# Patient Record
Sex: FEMALE | Race: OTHER | NOT HISPANIC OR LATINO
[De-identification: names, ages, dates, MRNs, and addresses within clinical notes are randomized per-mention and may not be internally consistent; named-entity substitution may affect disease eponyms.]

---

## 2017-12-27 ENCOUNTER — RX RENEWAL (OUTPATIENT)
Age: 72
End: 2017-12-27

## 2017-12-29 ENCOUNTER — RX RENEWAL (OUTPATIENT)
Age: 72
End: 2017-12-29

## 2018-02-16 ENCOUNTER — APPOINTMENT (OUTPATIENT)
Dept: NEPHROLOGY | Facility: CLINIC | Age: 73
End: 2018-02-16
Payer: MEDICARE

## 2018-02-16 VITALS — SYSTOLIC BLOOD PRESSURE: 140 MMHG | DIASTOLIC BLOOD PRESSURE: 80 MMHG

## 2018-02-16 VITALS — DIASTOLIC BLOOD PRESSURE: 84 MMHG | HEART RATE: 64 BPM | SYSTOLIC BLOOD PRESSURE: 148 MMHG

## 2018-02-16 DIAGNOSIS — R73.03 PREDIABETES.: ICD-10-CM

## 2018-02-16 DIAGNOSIS — Z72.0 TOBACCO USE: ICD-10-CM

## 2018-02-16 PROCEDURE — 99214 OFFICE O/P EST MOD 30 MIN: CPT

## 2018-02-16 RX ORDER — BIMATOPROST 0.1 MG/ML
0.01 SOLUTION/ DROPS OPHTHALMIC
Qty: 7 | Refills: 0 | Status: ACTIVE | COMMUNITY
Start: 2017-08-22

## 2018-02-16 RX ORDER — BLOOD SUGAR DIAGNOSTIC
STRIP MISCELLANEOUS
Qty: 100 | Refills: 0 | Status: ACTIVE | COMMUNITY
Start: 2017-09-06

## 2018-02-16 RX ORDER — APIXABAN 5 MG/1
5 TABLET, FILM COATED ORAL TWICE DAILY
Qty: 180 | Refills: 0 | Status: ACTIVE | COMMUNITY
Start: 2017-04-14

## 2018-02-16 RX ORDER — LANCETS 33 GAUGE
EACH MISCELLANEOUS
Qty: 100 | Refills: 0 | Status: ACTIVE | COMMUNITY
Start: 2017-09-06

## 2018-03-21 ENCOUNTER — RX RENEWAL (OUTPATIENT)
Age: 73
End: 2018-03-21

## 2018-04-20 ENCOUNTER — APPOINTMENT (OUTPATIENT)
Dept: NEPHROLOGY | Facility: CLINIC | Age: 73
End: 2018-04-20
Payer: MEDICARE

## 2018-04-20 VITALS
BODY MASS INDEX: 29.09 KG/M2 | DIASTOLIC BLOOD PRESSURE: 74 MMHG | HEART RATE: 64 BPM | HEIGHT: 66 IN | WEIGHT: 181 LBS | SYSTOLIC BLOOD PRESSURE: 132 MMHG

## 2018-04-20 PROCEDURE — 99213 OFFICE O/P EST LOW 20 MIN: CPT

## 2018-10-19 ENCOUNTER — APPOINTMENT (OUTPATIENT)
Dept: NEPHROLOGY | Facility: CLINIC | Age: 73
End: 2018-10-19
Payer: MEDICARE

## 2018-10-19 VITALS — DIASTOLIC BLOOD PRESSURE: 84 MMHG | SYSTOLIC BLOOD PRESSURE: 130 MMHG | HEART RATE: 64 BPM

## 2018-10-19 PROCEDURE — 99213 OFFICE O/P EST LOW 20 MIN: CPT

## 2018-10-19 RX ORDER — POTASSIUM CHLORIDE 750 MG/1
10 TABLET, EXTENDED RELEASE ORAL DAILY
Refills: 0 | Status: DISCONTINUED | COMMUNITY
Start: 2017-09-27 | End: 2018-10-19

## 2019-01-10 ENCOUNTER — RX RENEWAL (OUTPATIENT)
Age: 74
End: 2019-01-10

## 2019-05-03 ENCOUNTER — APPOINTMENT (OUTPATIENT)
Dept: NEPHROLOGY | Facility: CLINIC | Age: 74
End: 2019-05-03
Payer: MEDICARE

## 2019-05-03 VITALS
DIASTOLIC BLOOD PRESSURE: 78 MMHG | HEART RATE: 56 BPM | SYSTOLIC BLOOD PRESSURE: 124 MMHG | WEIGHT: 192 LBS | BODY MASS INDEX: 30.99 KG/M2

## 2019-05-03 VITALS — DIASTOLIC BLOOD PRESSURE: 76 MMHG | SYSTOLIC BLOOD PRESSURE: 110 MMHG

## 2019-05-03 PROCEDURE — 99213 OFFICE O/P EST LOW 20 MIN: CPT

## 2019-05-03 RX ORDER — MULTIVIT-MIN/FOLIC/VIT K/LYCOP 400-300MCG
50 MCG TABLET ORAL
Qty: 30 | Refills: 5 | Status: ACTIVE | COMMUNITY
Start: 2019-05-03

## 2019-05-03 NOTE — PHYSICAL EXAM
[General Appearance - Alert] : alert [General Appearance - In No Acute Distress] : in no acute distress [Sclera] : the sclera and conjunctiva were normal [Jugular Venous Distention Increased] : there was no jugular-venous distention [Auscultation Breath Sounds / Voice Sounds] : lungs were clear to auscultation bilaterally [Heart Sounds Gallop] : no gallops [Murmurs] : no murmurs [Heart Sounds Pericardial Friction Rub] : no pericardial rub [Edema] : there was no peripheral edema [Abdomen Soft] : soft [Abdomen Tenderness] : non-tender [No CVA Tenderness] : no ~M costovertebral angle tenderness [Involuntary Movements] : no involuntary movements were seen [] : no rash [No Focal Deficits] : no focal deficits [Oriented To Time, Place, And Person] : oriented to person, place, and time [Affect] : the affect was normal [Bradycardic ___] : the heart rate was bradycardic at [unfilled] bpm [Regular] : the rhythm was regular

## 2019-05-03 NOTE — ASSESSMENT
[FreeTextEntry1] : CKD 3 stable \par BP  controlled - seems lower on left arm but good on right -(asymmetry may suggest CV disease?)\par with low HR wonder if can lower atenolol -- to d/w cardiologist  (Dr Roberts) \par no proteinuria \par on supplementation for low vit D - with slt high PTH --follow \par cont regimen \par discussed smoking cessation - has cut down \par f/u 6 mos \par

## 2019-05-03 NOTE — HISTORY OF PRESENT ILLNESS
[FreeTextEntry1] : f/u CKD\par no complaints \par no med changes \par remains off KCL - vit D added for low level \par PCP Dr Oconnor

## 2019-11-19 ENCOUNTER — APPOINTMENT (OUTPATIENT)
Dept: NEPHROLOGY | Facility: CLINIC | Age: 74
End: 2019-11-19
Payer: MEDICARE

## 2019-11-19 VITALS — SYSTOLIC BLOOD PRESSURE: 112 MMHG | DIASTOLIC BLOOD PRESSURE: 64 MMHG | HEART RATE: 64 BPM

## 2019-11-19 PROCEDURE — 99213 OFFICE O/P EST LOW 20 MIN: CPT

## 2019-11-19 NOTE — PHYSICAL EXAM
[General Appearance - Alert] : alert [General Appearance - In No Acute Distress] : in no acute distress [Sclera] : the sclera and conjunctiva were normal [Auscultation Breath Sounds / Voice Sounds] : lungs were clear to auscultation bilaterally [Jugular Venous Distention Increased] : there was no jugular-venous distention [Heart Rate And Rhythm] : heart rate was normal and rhythm regular [Heart Sounds Gallop] : no gallops [Heart Sounds Pericardial Friction Rub] : no pericardial rub [Murmurs] : no murmurs [Edema] : there was no peripheral edema [Abdomen Soft] : soft [No CVA Tenderness] : no ~M costovertebral angle tenderness [Abdomen Tenderness] : non-tender [Involuntary Movements] : no involuntary movements were seen [] : no rash [No Focal Deficits] : no focal deficits [Oriented To Time, Place, And Person] : oriented to person, place, and time [Affect] : the affect was normal

## 2019-11-19 NOTE — HISTORY OF PRESENT ILLNESS
[FreeTextEntry1] : f/u CKD \par no complaints \par reports on new med for 8 mos - ?spironolactone she thinks? -- put on by Dr Roberts for HTN she believes \par BP has been good at doctors office usually \par stopped smoking!\par PCP Dr Oconnor

## 2019-11-19 NOTE — ASSESSMENT
[FreeTextEntry1] : CKD 3 relatively  stable \par BP well controlled \par lytes good\par no albuminuria \par cont regimen -- clarify med list \par congratulated on smoking cessation\par f/u 6- 8 mos

## 2020-01-21 ENCOUNTER — RX RENEWAL (OUTPATIENT)
Age: 75
End: 2020-01-21

## 2020-06-15 ENCOUNTER — APPOINTMENT (OUTPATIENT)
Dept: NEPHROLOGY | Facility: CLINIC | Age: 75
End: 2020-06-15
Payer: MEDICARE

## 2020-06-15 VITALS
DIASTOLIC BLOOD PRESSURE: 74 MMHG | BODY MASS INDEX: 32.44 KG/M2 | SYSTOLIC BLOOD PRESSURE: 112 MMHG | HEART RATE: 60 BPM | WEIGHT: 201 LBS

## 2020-06-15 DIAGNOSIS — N17.9 ACUTE KIDNEY FAILURE, UNSPECIFIED: ICD-10-CM

## 2020-06-15 PROCEDURE — 99214 OFFICE O/P EST MOD 30 MIN: CPT

## 2020-06-15 NOTE — HISTORY OF PRESENT ILLNESS
[FreeTextEntry1] : f/u CKD\par no complaints \par meds reviewed -- no changes except only on two atenolol  a day now --thinks also on spironolactone (or sounds like it) still but not sure (replaced some other pill by cardiology) \par hasn’t checked BP at home- good last visit with PCP\par gaining weight with sedentary lifestyle during COVID\par PCP Dr Oconnor\par

## 2020-06-15 NOTE — PHYSICAL EXAM
[General Appearance - Alert] : alert [General Appearance - In No Acute Distress] : in no acute distress [Sclera] : the sclera and conjunctiva were normal [Jugular Venous Distention Increased] : there was no jugular-venous distention [Auscultation Breath Sounds / Voice Sounds] : lungs were clear to auscultation bilaterally [Heart Rate And Rhythm] : heart rate was normal and rhythm regular [Heart Sounds Gallop] : no gallops [Murmurs] : no murmurs [Heart Sounds Pericardial Friction Rub] : no pericardial rub [Edema] : there was no peripheral edema [Abdomen Soft] : soft [Abdomen Tenderness] : non-tender [No CVA Tenderness] : no ~M costovertebral angle tenderness [Involuntary Movements] : no involuntary movements were seen [] : no rash [Oriented To Time, Place, And Person] : oriented to person, place, and time [Affect] : the affect was normal

## 2020-06-15 NOTE — ASSESSMENT
[FreeTextEntry1] : CKD 3 -- creat has trended up from baseline of about 1.2-1.4\par BP very well controlled with no albuminuria\par no sign CHF \par may consider lower med-- eg ARB (or spironolactone if confirmed that on that ) -- will recheck chem now to see if creat still up\par pt to confirm additional med she is on -- plan d/w cardiology if indeed spironolactone (if needs it  than will likely lower ARB) \par f/u 3-4 mos likely \par

## 2021-01-08 ENCOUNTER — RX RENEWAL (OUTPATIENT)
Age: 76
End: 2021-01-08

## 2021-01-22 ENCOUNTER — APPOINTMENT (OUTPATIENT)
Dept: NEPHROLOGY | Facility: CLINIC | Age: 76
End: 2021-01-22
Payer: MEDICARE

## 2021-01-22 VITALS
BODY MASS INDEX: 32.28 KG/M2 | HEART RATE: 73 BPM | DIASTOLIC BLOOD PRESSURE: 76 MMHG | SYSTOLIC BLOOD PRESSURE: 122 MMHG | WEIGHT: 200 LBS

## 2021-01-22 PROCEDURE — 99072 ADDL SUPL MATRL&STAF TM PHE: CPT

## 2021-01-22 PROCEDURE — 99213 OFFICE O/P EST LOW 20 MIN: CPT

## 2021-01-22 RX ORDER — ATENOLOL 25 MG/1
25 TABLET ORAL DAILY
Qty: 180 | Refills: 3 | Status: DISCONTINUED | COMMUNITY
Start: 2016-11-30 | End: 2021-01-22

## 2021-01-25 NOTE — ASSESSMENT
[FreeTextEntry1] : CKD 3 stable \par HTN controlled and lytes good with addition of low dose aldactone-no hyperkalemia despite 2 RAAS agents\par foot pain - not obviously inflamed to suggest gout - doen have decreased pulses b/l though also not grossly ischemic\par advised d/w PCP if persists and f/u podiatry +/- vascular \par cont BP regimen\par f/u 4 mos\par \par \par \par

## 2021-01-25 NOTE — PHYSICAL EXAM
[General Appearance - Alert] : alert [General Appearance - In No Acute Distress] : in no acute distress [Sclera] : the sclera and conjunctiva were normal [Jugular Venous Distention Increased] : there was no jugular-venous distention [Auscultation Breath Sounds / Voice Sounds] : lungs were clear to auscultation bilaterally [Heart Rate And Rhythm] : heart rate was normal and rhythm regular [Heart Sounds Gallop] : no gallops [Murmurs] : no murmurs [Heart Sounds Pericardial Friction Rub] : no pericardial rub [Edema] : there was no peripheral edema [Abdomen Tenderness] : non-tender [Abdomen Soft] : soft [No CVA Tenderness] : no ~M costovertebral angle tenderness [Involuntary Movements] : no involuntary movements were seen [] : no rash [No Focal Deficits] : no focal deficits [Oriented To Time, Place, And Person] : oriented to person, place, and time [Affect] : the affect was normal [FreeTextEntry1] : rt foor not grossly inflames (or ischemic)

## 2021-05-25 ENCOUNTER — APPOINTMENT (OUTPATIENT)
Dept: VASCULAR SURGERY | Facility: CLINIC | Age: 76
End: 2021-05-25
Payer: MEDICARE

## 2021-05-25 ENCOUNTER — APPOINTMENT (OUTPATIENT)
Dept: NEPHROLOGY | Facility: CLINIC | Age: 76
End: 2021-05-25
Payer: MEDICARE

## 2021-05-25 VITALS
SYSTOLIC BLOOD PRESSURE: 132 MMHG | WEIGHT: 200 LBS | BODY MASS INDEX: 32.28 KG/M2 | HEART RATE: 82 BPM | DIASTOLIC BLOOD PRESSURE: 77 MMHG

## 2021-05-25 DIAGNOSIS — R09.89 OTHER SPECIFIED SYMPTOMS AND SIGNS INVOLVING THE CIRCULATORY AND RESPIRATORY SYSTEMS: ICD-10-CM

## 2021-05-25 PROCEDURE — 99213 OFFICE O/P EST LOW 20 MIN: CPT

## 2021-05-25 PROCEDURE — 99204 OFFICE O/P NEW MOD 45 MIN: CPT

## 2021-05-25 PROCEDURE — 93923 UPR/LXTR ART STDY 3+ LVLS: CPT

## 2021-05-25 NOTE — PHYSICAL EXAM
[General Appearance - Alert] : alert [Sclera] : the sclera and conjunctiva were normal [General Appearance - In No Acute Distress] : in no acute distress [Jugular Venous Distention Increased] : there was no jugular-venous distention [Auscultation Breath Sounds / Voice Sounds] : lungs were clear to auscultation bilaterally [Heart Rate And Rhythm] : heart rate was normal and rhythm regular [Heart Sounds Gallop] : no gallops [Murmurs] : no murmurs [Heart Sounds Pericardial Friction Rub] : no pericardial rub [Edema] : there was no peripheral edema [Abdomen Soft] : soft [Abdomen Tenderness] : non-tender [No CVA Tenderness] : no ~M costovertebral angle tenderness [Involuntary Movements] : no involuntary movements were seen [] : no rash [No Focal Deficits] : no focal deficits [Oriented To Time, Place, And Person] : oriented to person, place, and time [Affect] : the affect was normal

## 2021-05-25 NOTE — HISTORY OF PRESENT ILLNESS
[FreeTextEntry1] : f/u CKD\par no complaints \par BP has been good at doctor visits\par still  rt toe pain (no redness or swelling)   -- saw vascular and said circulation good-- seeing podiatry \par labs done and reviewed - see below \par meds reviewed with pt and list updated\par PCP Dr Oconnor \par

## 2021-05-25 NOTE — REVIEW OF SYSTEMS
[Limb Pain] : limb pain [As Noted in HPI] : as noted in HPI [Difficulty Walking] : difficulty walking [Negative] : Heme/Lymph

## 2021-05-25 NOTE — ASSESSMENT
[FreeTextEntry1] : CKD 3 stable \par BP acceptable\par no albuminuria\par lytes ok \par cont regimen \par f/u 6 mos

## 2021-05-28 NOTE — ASSESSMENT
[FreeTextEntry1] : 75yoF w/chronic h/o HTN/prediabetes, recently diagnosed CRI, referred by Dr. Bettie Broussard for non-palpable b/l LE pedal pulses in the setting of radiating R lateral thigh pain at random times, and numbness of the toes and intermittent R great toe pain, especially at night.  Pt states her ambulation is not limited by muscle cramping or leg pain, and she denies rest pain in the feet/toes, or ulcers/wounds of the LEs.\par \par LEs appear well-perfused on exam, and pedal pulses faint but appreciated on exam.  LEYLA/PVRs reveal biphasic waveforms throughout LEs b/l, LEYLA 1.1/0.94 in L/R, respectively.  Recommend pt continue tight glycemic control and exercise regularly.  Explained that evaluation for peripheral neuropathy may be indicated to further evaluate her radiating pain and pedal numbness.\par \par I, Dr. José Manuel Melgar, personally performed the evaluation and management (E/M) services for this new patient.  That E/M includes conducting the initial examination, assessing all conditions, and establishing the plan of care.  Today, ACP, Jose Bernstein, was here to observe my evaluation and management services for this patient to be followed going forward.

## 2021-05-28 NOTE — HISTORY OF PRESENT ILLNESS
[FreeTextEntry1] : 75yoF w/chronic h/o HTN/prediabetes, Afib on eliquis, recently diagnosed CRI, referred by Dr. Bettie Broussard for non-palpable b/l LE pedal pulses in the setting of radiating R lateral thigh pain at random times, and numbness of the toes and intermittent R great toe pain, especially at night.  Pt states her ambulation is not limited by muscle cramping or leg pain, and she denies rest pain in the feet/toes, or ulcers/wounds of the LEs.

## 2021-05-28 NOTE — PHYSICAL EXAM
[Normal Thyroid] : the thyroid was normal [Normal Breath Sounds] : Normal breath sounds [Normal Heart Sounds] : normal heart sounds [2+] : left 2+ [1+] : left 1+ [Ankle Swelling (On Exam)] : present [Ankle Swelling Bilaterally] : bilaterally  [Ankle Swelling On The Right] : mild [No Rash or Lesion] : No rash or lesion [Alert] : alert [Oriented to Person] : oriented to person [Oriented to Place] : oriented to place [Oriented to Time] : oriented to time [Calm] : calm [JVD] : no jugular venous distention  [Varicose Veins Of Lower Extremities] : not present [] : not present [Abdomen Masses] : No abdominal masses [Abdomen Tenderness] : ~T ~M No abdominal tenderness [Purpura] : no purpura  [Petechiae] : no petechiae [Skin Ulcer] : no ulcer [Skin Induration] : no induration [de-identified] : NC/AT, anicteric [de-identified] : Well-nourished, NAD [de-identified] : FROM throughout, strength 5/5x4, no muscle atrophy appreciated [de-identified] : Proprioception grossly intact in LEs b/l

## 2021-05-28 NOTE — PROCEDURE
[FreeTextEntry1] : LEYLA/PVRs performed to assess for peripheral arterial perfusion reveal biphasic waveforms throughout LEs b/l, LEYLA 1.1/0.94 in L/R, respectively

## 2021-05-28 NOTE — REASON FOR VISIT
[Consultation] : a consultation visit [FreeTextEntry1] : Non-palpable pedal pulses as per Dr. Broussard; radiating R lateral thigh pain w/numbness of toes

## 2021-10-20 ENCOUNTER — RX RENEWAL (OUTPATIENT)
Age: 76
End: 2021-10-20

## 2021-10-20 RX ORDER — AMLODIPINE BESYLATE AND BENAZEPRIL HYDROCHLORIDE 10; 40 MG/1; MG/1
10-40 CAPSULE ORAL
Qty: 90 | Refills: 3 | Status: ACTIVE | COMMUNITY
Start: 2017-12-29 | End: 1900-01-01

## 2021-12-29 ENCOUNTER — APPOINTMENT (OUTPATIENT)
Dept: NEPHROLOGY | Facility: CLINIC | Age: 76
End: 2021-12-29
Payer: MEDICARE

## 2021-12-29 VITALS
WEIGHT: 203 LBS | BODY MASS INDEX: 32.77 KG/M2 | HEART RATE: 91 BPM | DIASTOLIC BLOOD PRESSURE: 81 MMHG | SYSTOLIC BLOOD PRESSURE: 128 MMHG

## 2021-12-29 DIAGNOSIS — E55.9 VITAMIN D DEFICIENCY, UNSPECIFIED: ICD-10-CM

## 2021-12-29 PROCEDURE — 99213 OFFICE O/P EST LOW 20 MIN: CPT

## 2021-12-29 NOTE — HISTORY OF PRESENT ILLNESS
[FreeTextEntry1] : f/u CKD \par no complaints\par Not checking BP at home-- was good with PCP \par smoking 1/2 PPD \par labs done and reviewed - see below\par meds reviewed with pt-no changes \par PCP Dr Oconnor

## 2021-12-29 NOTE — ASSESSMENT
[FreeTextEntry1] : CKD 3 stable function \par no albuminuria, BP controlled \par PTH ok for CKD -- vit D low side--advised take extra vit D qod for now \par f/u 6 mos \par \par

## 2022-06-21 ENCOUNTER — APPOINTMENT (OUTPATIENT)
Dept: NEPHROLOGY | Facility: CLINIC | Age: 77
End: 2022-06-21

## 2022-07-19 ENCOUNTER — APPOINTMENT (OUTPATIENT)
Dept: NEPHROLOGY | Facility: CLINIC | Age: 77
End: 2022-07-19

## 2022-07-19 VITALS
DIASTOLIC BLOOD PRESSURE: 81 MMHG | HEIGHT: 66 IN | OXYGEN SATURATION: 96 % | HEART RATE: 80 BPM | SYSTOLIC BLOOD PRESSURE: 134 MMHG

## 2022-07-19 VITALS — SYSTOLIC BLOOD PRESSURE: 131 MMHG | DIASTOLIC BLOOD PRESSURE: 79 MMHG

## 2022-07-19 PROCEDURE — 99214 OFFICE O/P EST MOD 30 MIN: CPT

## 2022-07-19 RX ORDER — LATANOPROST/PF 0.005 %
0.01 DROPS OPHTHALMIC (EYE)
Qty: 7 | Refills: 0 | Status: ACTIVE | COMMUNITY
Start: 2021-12-21

## 2022-07-19 NOTE — HISTORY OF PRESENT ILLNESS
[FreeTextEntry1] : f/u CKD\par no complaints \par meds reviewed-- no change\par labs done and reviewed - see below\par Not checking BP at home btu wasa good last visit with PCP \par PCP Dr Oconnor \par card Dr Roberts

## 2022-07-19 NOTE — ASSESSMENT
[FreeTextEntry1] : CKD 3 stable fxn \par no albuminuria, lytes good \par BP acceptable -- though discussed BP not optimal per SPRINT study \par discussed lifestyle mod , weight loss for obesity \par follow BP -- reluctant to increase aldactone with CKD and high nl K \par can consider low dose thiazide if BP suboptimal \par f/u 6 mos \par

## 2022-11-14 ENCOUNTER — APPOINTMENT (OUTPATIENT)
Dept: PULMONOLOGY | Facility: CLINIC | Age: 77
End: 2022-11-14

## 2022-11-14 ENCOUNTER — OUTPATIENT (OUTPATIENT)
Dept: OUTPATIENT SERVICES | Facility: HOSPITAL | Age: 77
LOS: 1 days | End: 2022-11-14
Payer: MEDICARE

## 2022-11-14 VITALS
DIASTOLIC BLOOD PRESSURE: 84 MMHG | TEMPERATURE: 97.3 F | WEIGHT: 193 LBS | HEART RATE: 93 BPM | SYSTOLIC BLOOD PRESSURE: 149 MMHG | HEIGHT: 66 IN | OXYGEN SATURATION: 97 % | BODY MASS INDEX: 31.02 KG/M2

## 2022-11-14 PROCEDURE — 94010 BREATHING CAPACITY TEST: CPT | Mod: 26

## 2022-11-14 PROCEDURE — 99204 OFFICE O/P NEW MOD 45 MIN: CPT | Mod: 25

## 2022-11-14 PROCEDURE — 94727 GAS DIL/WSHOT DETER LNG VOL: CPT | Mod: 26

## 2022-11-14 PROCEDURE — ZZZZZ: CPT

## 2022-11-14 PROCEDURE — 71046 X-RAY EXAM CHEST 2 VIEWS: CPT | Mod: 26

## 2022-11-14 PROCEDURE — 94729 DIFFUSING CAPACITY: CPT | Mod: 26

## 2022-11-14 PROCEDURE — 71046 X-RAY EXAM CHEST 2 VIEWS: CPT

## 2022-11-14 NOTE — ASSESSMENT
[FreeTextEntry1] : Pulmonary function testing done today shows FEV1 1.32 L, 57% predicted, lung volumes look restricted with TLC 62% predicted.  Diffusion capacity 56% predicted, normal when corrected for alveolar volume.  Is mostly a restrictive defect with normal FEV1 FVC ratio.\par \par CXR ordered for today- wet read no obvious inrfiltrate\par \par Apparently severe pneumonia discharge about 6 weeks ago, clinically improving.  I am not sure she needs any additional treatment at this time.  I will see her again in about 6 weeks.\par \par

## 2022-11-14 NOTE — HISTORY OF PRESENT ILLNESS
[TextBox_4] : 11/14/2022 :  SOILA ARIAS is a 77 year old forme smoker female (quit 8/2022, 1PPD x 60 years) with PMHx HTN, A.fib on eliquis,  DM, HLD who is here for c/o  PRUITT and cough. \par \par She was hospitalized from 9/23-10/3 for PNA at [facility].  She tells me she required a respirator, but does not describe being intubated.  She is dyspneic after her discharge. She also c/o cough which is non productive. She states also had test positive for covid last month without hospitalization. She is now here for further evaluation and  treatment \par \par She tells me she has had atrial fibrillation for more than 10 years, chose not to have cardioversion or ablation and has not felt troubled by this.  She is not aware of palpitations.\par \par \par \par

## 2022-11-14 NOTE — PHYSICAL EXAM
[No Acute Distress] : no acute distress [Normal Oropharynx] : normal oropharynx [Normal Appearance] : normal appearance [No Neck Mass] : no neck mass [Normal Rate/Rhythm] : normal rate/rhythm [Normal S1, S2] : normal s1, s2 [No Resp Distress] : no resp distress [Clear to Auscultation Bilaterally] : clear to auscultation bilaterally [No Abnormalities] : no abnormalities [No Focal Deficits] : no focal deficits [Oriented x3] : oriented x3 [Normal Affect] : normal affect

## 2022-11-30 ENCOUNTER — APPOINTMENT (OUTPATIENT)
Dept: NEPHROLOGY | Facility: CLINIC | Age: 77
End: 2022-11-30

## 2022-11-30 VITALS
SYSTOLIC BLOOD PRESSURE: 120 MMHG | DIASTOLIC BLOOD PRESSURE: 68 MMHG | WEIGHT: 190 LBS | HEART RATE: 94 BPM | BODY MASS INDEX: 30.67 KG/M2

## 2022-11-30 PROCEDURE — 99214 OFFICE O/P EST MOD 30 MIN: CPT

## 2022-11-30 RX ORDER — SPIRONOLACTONE 25 MG/1
25 TABLET ORAL DAILY
Qty: 45 | Refills: 3 | Status: DISCONTINUED | COMMUNITY
Start: 2021-01-11 | End: 2022-11-30

## 2022-11-30 RX ORDER — METOPROLOL SUCCINATE 25 MG/1
25 TABLET, EXTENDED RELEASE ORAL DAILY
Qty: 10 | Refills: 0 | Status: ACTIVE | COMMUNITY
Start: 2022-10-04 | End: 1900-01-01

## 2022-11-30 NOTE — HISTORY OF PRESENT ILLNESS
[FreeTextEntry1] : f/u CKD \par has been having chronic PRUITT since admission for pneumonia at [facility]-- 9/23- 10/3\par taken off aldactone and metoprolol added for irregular HR  (though has  ran out of it but restarting per cardiology) \par meds reviewed with pt \par BP has been good at doctor visits 120s systolic\par has chem available- no other labs\par PCP Dr Oconnor \par card -  Dr Melanie Roberts \par \par \par

## 2022-11-30 NOTE — ASSESSMENT
[FreeTextEntry1] : CKD 3 appears stable; post admission \par volume seem good\par BP controlled off aldactone -- and to restart metoprolol\par cont regimen incl ACEI \par f/u 3-4 mos with labs incl urine sample --labs ordered

## 2023-01-17 ENCOUNTER — APPOINTMENT (OUTPATIENT)
Dept: NEPHROLOGY | Facility: CLINIC | Age: 78
End: 2023-01-17

## 2023-01-18 ENCOUNTER — APPOINTMENT (OUTPATIENT)
Dept: PULMONOLOGY | Facility: CLINIC | Age: 78
End: 2023-01-18
Payer: MEDICARE

## 2023-01-18 VITALS
BODY MASS INDEX: 32.3 KG/M2 | TEMPERATURE: 97.7 F | HEIGHT: 66 IN | HEART RATE: 80 BPM | OXYGEN SATURATION: 96 % | DIASTOLIC BLOOD PRESSURE: 85 MMHG | WEIGHT: 201 LBS | SYSTOLIC BLOOD PRESSURE: 149 MMHG

## 2023-01-18 DIAGNOSIS — R05.9 COUGH, UNSPECIFIED: ICD-10-CM

## 2023-01-18 PROCEDURE — 99214 OFFICE O/P EST MOD 30 MIN: CPT

## 2023-01-19 PROBLEM — R05.9 COUGH: Status: ACTIVE | Noted: 2022-11-14

## 2023-01-19 NOTE — HISTORY OF PRESENT ILLNESS
[FreeTextEntry1] : 01/18/2023 :  SOILA ARIAS is a 77 year former smoker   (quit 8/2022, 1PPD x 60 years) with PMHx HTN, atrial fibrillation  on eliquis, DM, HLD who is here for follow up on and off SOB.  \par \par She is still c/o intermittent SOB even at rest but denies cough and denies using any inhaler. \par \par Her last chest xray was from Nov 2022 which showed: Unremarkable cardiomediastinal silhouette. Vascular calcification thoracic aorta.The lungs are clear. No pleural effusions. No pneumothorax. No acute abnormalities of the soft tissues and osseous structures. Her PFT from Nov showed moderate decrease in diffuse capacity. \par \par She gained about 10lb since the last visit and denies any new complains or diagnosis since the last visit\par

## 2023-01-19 NOTE — PHYSICAL EXAM
[General Appearance - In No Acute Distress] : no acute distress [Normal Oropharynx] : normal oropharynx [Apical Impulse] : the apical impulse was normal [Heart Sounds] : normal S1 and S2 [Exaggerated Use Of Accessory Muscles For Inspiration] : no accessory muscle use [Abnormal Walk] : normal gait [Involuntary Movements] : no involuntary movements were seen [No Focal Deficits] : no focal deficits [Oriented To Time, Place, And Person] : oriented to person, place, and time [Affect] : the affect was normal [Nail Clubbing] : no clubbing of the fingernails [Cyanosis, Localized] : no localized cyanosis [Petechial Hemorrhages (___cm)] : no petechial hemorrhages [] : no ischemic changes

## 2023-01-19 NOTE — ASSESSMENT
[FreeTextEntry1] : 76 y/o obese F with c/o SOB and clear chest xray who is here for follow up after still c/o SOB.   Will get CT chest to r/o interstitial lung disease, echocardiogram to r/o cardiac etiology or pulmonary hypertension

## 2023-02-16 ENCOUNTER — APPOINTMENT (OUTPATIENT)
Dept: PULMONOLOGY | Facility: CLINIC | Age: 78
End: 2023-02-16
Payer: MEDICARE

## 2023-02-16 VITALS
DIASTOLIC BLOOD PRESSURE: 80 MMHG | TEMPERATURE: 97.6 F | HEIGHT: 66 IN | SYSTOLIC BLOOD PRESSURE: 149 MMHG | BODY MASS INDEX: 32.3 KG/M2 | WEIGHT: 201 LBS | HEART RATE: 79 BPM | OXYGEN SATURATION: 97 %

## 2023-02-16 DIAGNOSIS — R06.02 SHORTNESS OF BREATH: ICD-10-CM

## 2023-02-16 DIAGNOSIS — R91.8 OTHER NONSPECIFIC ABNORMAL FINDING OF LUNG FIELD: ICD-10-CM

## 2023-02-16 PROCEDURE — 94060 EVALUATION OF WHEEZING: CPT | Mod: 26

## 2023-02-16 PROCEDURE — 99214 OFFICE O/P EST MOD 30 MIN: CPT | Mod: 25

## 2023-02-16 PROCEDURE — 94727 GAS DIL/WSHOT DETER LNG VOL: CPT | Mod: 26

## 2023-02-16 PROCEDURE — ZZZZZ: CPT

## 2023-02-16 PROCEDURE — 94729 DIFFUSING CAPACITY: CPT | Mod: 26

## 2023-02-17 NOTE — PHYSICAL EXAM
[General Appearance - Well Developed] : well developed [Normal Appearance] : normal appearance [Well Groomed] : well groomed [General Appearance - Well Nourished] : well nourished [No Deformities] : no deformities [General Appearance - In No Acute Distress] : no acute distress [Heart Rate And Rhythm] : heart rate was normal and rhythm regular [Heart Sounds] : normal S1 and S2 [Heart Sounds Gallop] : no gallops [Murmurs] : no murmurs [Heart Sounds Pericardial Friction Rub] : no pericardial rub [] : no respiratory distress [Exaggerated Use Of Accessory Muscles For Inspiration] : no accessory muscle use [Auscultation Breath Sounds / Voice Sounds] : lungs were clear to auscultation bilaterally [FreeTextEntry1] : Decreased breath sounds bilaterally

## 2023-02-17 NOTE — ASSESSMENT
[FreeTextEntry1] : COPD, moderate\par \par Chest x-ray in November normal.  CT chest January 30, 2023 shows hyperinflation without obvious emphysema, several small groundglass opacities, largest 7 mm right upper lobe.  Cardiomegaly and coronary artery calcifications.\par \par Start Anoro Ellipta, demonstrated in office (LABA/LAMA).  Return for evaluation in about a month to see what subjective improvement she has with this.  She will need her chest CAT scan repeated in about 6 months.

## 2023-02-17 NOTE — HISTORY OF PRESENT ILLNESS
[FreeTextEntry1] : 01/18/2023 :  SOILA ARIAS is a 77 year former smoker   (quit 8/2022, 1PPD x 60 years) with PMHx HTN, atrial fibrillation  on eliquis, DM, HLD who is here for follow up on and off SOB.  \par \par She is still c/o intermittent SOB even at rest but denies cough and denies using any inhaler. \par \par Her last chest xray was from Nov 2022 which showed: Unremarkable cardiomediastinal silhouette. Vascular calcification thoracic aorta.The lungs are clear. No pleural effusions. No pneumothorax. No acute abnormalities of the soft tissues and osseous structures. Her PFT from Nov showed moderate decrease in diffuse capacity. \par \par She gained about 10lb since the last visit and denies any new complains or diagnosis since the last visit\par \par 2/17/23:  No significant medical history or changes in medication since last visit.  Here today for pulmonary function testing: moderate obstruction, FEV1 69% predicted, small improvement with bronchodilators.  Fusion capacity 66% predicted\par

## 2023-03-08 ENCOUNTER — APPOINTMENT (OUTPATIENT)
Dept: PULMONOLOGY | Facility: CLINIC | Age: 78
End: 2023-03-08

## 2023-03-16 ENCOUNTER — APPOINTMENT (OUTPATIENT)
Dept: PULMONOLOGY | Facility: CLINIC | Age: 78
End: 2023-03-16
Payer: MEDICARE

## 2023-03-16 VITALS
HEIGHT: 66 IN | DIASTOLIC BLOOD PRESSURE: 89 MMHG | BODY MASS INDEX: 32.47 KG/M2 | SYSTOLIC BLOOD PRESSURE: 159 MMHG | TEMPERATURE: 98.3 F | WEIGHT: 202 LBS | OXYGEN SATURATION: 96 % | HEART RATE: 78 BPM

## 2023-03-16 DIAGNOSIS — J44.9 CHRONIC OBSTRUCTIVE PULMONARY DISEASE, UNSPECIFIED: ICD-10-CM

## 2023-03-16 PROCEDURE — 99213 OFFICE O/P EST LOW 20 MIN: CPT

## 2023-03-17 PROBLEM — J44.9 COPD (CHRONIC OBSTRUCTIVE PULMONARY DISEASE): Status: ACTIVE | Noted: 2023-02-16

## 2023-03-17 NOTE — HISTORY OF PRESENT ILLNESS
[FreeTextEntry1] : 01/18/2023 :  SOILA ARIAS is a 77 year former smoker   (quit 8/2022, 1PPD x 60 years) with PMHx HTN, atrial fibrillation  on eliquis, DM, HLD who is here for follow up on and off SOB.  \par \par She is still c/o intermittent SOB even at rest but denies cough and denies using any inhaler. \par \par Her last chest xray was from Nov 2022 which showed: Unremarkable cardiomediastinal silhouette. Vascular calcification thoracic aorta.The lungs are clear. No pleural effusions. No pneumothorax. No acute abnormalities of the soft tissues and osseous structures. Her PFT from Nov showed moderate decrease in diffuse capacity. \par \par She gained about 10lb since the last visit and denies any new complains or diagnosis since the last visit\par \par 2/17/23:  No significant medical history or changes in medication since last visit.  Here today for pulmonary function testing: moderate obstruction, FEV1 69% predicted, small improvement with bronchodilators.  diffusion capacity 66% predicted\par \par 3/16/23:  On Anoro daily for one month, has noted good improvement in shortness of breath.  No significant exacerbations of her lung disease since last seen.  No significant medical history or changes in medication since last visit. \par

## 2023-03-17 NOTE — ASSESSMENT
[FreeTextEntry1] : COPD with subjective improvement using Anoro (LABA LAMA).  I reassured her that this is not a steroid and I would like her to use this long-term.  I would like to see her again in about 4 months.

## 2023-03-21 ENCOUNTER — APPOINTMENT (OUTPATIENT)
Dept: NEPHROLOGY | Facility: CLINIC | Age: 78
End: 2023-03-21
Payer: MEDICARE

## 2023-03-21 VITALS — SYSTOLIC BLOOD PRESSURE: 123 MMHG | HEART RATE: 66 BPM | DIASTOLIC BLOOD PRESSURE: 71 MMHG

## 2023-03-21 VITALS — SYSTOLIC BLOOD PRESSURE: 130 MMHG | HEART RATE: 75 BPM | DIASTOLIC BLOOD PRESSURE: 69 MMHG

## 2023-03-21 PROCEDURE — 99214 OFFICE O/P EST MOD 30 MIN: CPT

## 2023-03-22 NOTE — ASSESSMENT
[FreeTextEntry1] : CKD 3 appears stable\par volume and lytes stable\par BP well controlled off aldactone\par cont same regimen incl ACEI\par Jardiance 10mg started by PCP, labs requested iby PCP n 30 days to monitor Cr (though I am not sure necessary as slight decrease eGR is expected initially )  will order labs for 4/15 and then labs again prior to next visit \par f/u 5-6 mos with labs

## 2023-03-22 NOTE — HISTORY OF PRESENT ILLNESS
[FreeTextEntry1] : f/u CKD \par has been having chronic PRUITT since admission for pneumonia at [facility]-- 9/23-10/3, going to see a new pulmonologist\par Started on Jardiance 10mg by PCP on 3/15 for DM\par Does not check BP at home\par meds reviewed with pt \par Labs reviewed, see below\par PCP Dr Oconnor \par card - Dr Melanie Roberts

## 2023-03-22 NOTE — PHYSICAL EXAM
[General Appearance - Alert] : alert [General Appearance - In No Acute Distress] : in no acute distress [General Appearance - Well Nourished] : well nourished [Sclera] : the sclera and conjunctiva were normal [Hearing Threshold Finger Rub Not [place]] : hearing was normal [Neck Appearance] : the appearance of the neck was normal [Exaggerated Use Of Accessory Muscles For Inspiration] : no accessory muscle use [Heart Sounds] : normal S1 and S2 [Abdomen Soft] : soft [Abdomen Tenderness] : non-tender [No CVA Tenderness] : no ~M costovertebral angle tenderness [] : no rash [Sensation] : the sensory exam was normal to light touch and pinprick [Motor Exam] : the motor exam was normal [Oriented To Time, Place, And Person] : oriented to person, place, and time [FreeTextEntry1] : trace edema B/L LE

## 2023-09-19 ENCOUNTER — APPOINTMENT (OUTPATIENT)
Dept: NEPHROLOGY | Facility: CLINIC | Age: 78
End: 2023-09-19
Payer: MEDICARE

## 2023-09-19 VITALS — SYSTOLIC BLOOD PRESSURE: 151 MMHG | DIASTOLIC BLOOD PRESSURE: 82 MMHG

## 2023-09-19 VITALS
WEIGHT: 215 LBS | HEART RATE: 72 BPM | DIASTOLIC BLOOD PRESSURE: 85 MMHG | BODY MASS INDEX: 34.7 KG/M2 | SYSTOLIC BLOOD PRESSURE: 163 MMHG

## 2023-09-19 DIAGNOSIS — I10 ESSENTIAL (PRIMARY) HYPERTENSION: ICD-10-CM

## 2023-09-19 PROCEDURE — 99214 OFFICE O/P EST MOD 30 MIN: CPT

## 2023-09-19 RX ORDER — FLUTICASONE FUROATE, UMECLIDINIUM BROMIDE AND VILANTEROL TRIFENATATE 100; 62.5; 25 UG/1; UG/1; UG/1
100-62.5-25 POWDER RESPIRATORY (INHALATION)
Refills: 0 | Status: ACTIVE | COMMUNITY
Start: 2023-09-19

## 2023-09-19 RX ORDER — ATORVASTATIN CALCIUM 40 MG/1
40 TABLET, FILM COATED ORAL DAILY
Refills: 0 | Status: ACTIVE | COMMUNITY
Start: 2017-04-14

## 2023-09-19 RX ORDER — UMECLIDINIUM BROMIDE AND VILANTEROL TRIFENATATE 62.5; 25 UG/1; UG/1
62.5-25 POWDER RESPIRATORY (INHALATION)
Qty: 3 | Refills: 3 | Status: DISCONTINUED | COMMUNITY
Start: 2023-02-16 | End: 2023-09-19

## 2024-01-16 ENCOUNTER — APPOINTMENT (OUTPATIENT)
Dept: NEPHROLOGY | Facility: CLINIC | Age: 79
End: 2024-01-16
Payer: MEDICARE

## 2024-01-16 VITALS — SYSTOLIC BLOOD PRESSURE: 122 MMHG | HEART RATE: 78 BPM | DIASTOLIC BLOOD PRESSURE: 80 MMHG

## 2024-01-16 DIAGNOSIS — N18.30 CHRONIC KIDNEY DISEASE, STAGE 3 UNSPECIFIED: ICD-10-CM

## 2024-01-16 PROCEDURE — 99213 OFFICE O/P EST LOW 20 MIN: CPT

## 2024-01-16 RX ORDER — EMPAGLIFLOZIN 10 MG/1
10 TABLET, FILM COATED ORAL
Refills: 0 | Status: DISCONTINUED | COMMUNITY
Start: 2023-09-19 | End: 2024-01-16

## 2024-01-16 RX ORDER — DAPAGLIFLOZIN 10 MG/1
10 TABLET, FILM COATED ORAL
Qty: 90 | Refills: 3 | Status: ACTIVE | COMMUNITY
Start: 2024-01-16

## 2024-01-16 RX ORDER — SEMAGLUTIDE 0.68 MG/ML
2 INJECTION, SOLUTION SUBCUTANEOUS
Refills: 0 | Status: ACTIVE | COMMUNITY
Start: 2024-01-16

## 2024-01-16 NOTE — ASSESSMENT
[FreeTextEntry1] : CKD 3 stable BP controlled  no proteinuria on ACEI and SGLT2i ( confirm dose Farxiga)  lytes good cont regimen  f/u 6-8 mos  addendum: on Farxiga 10 mg/d

## 2024-01-16 NOTE — PHYSICAL EXAM
[General Appearance - Alert] : alert [General Appearance - In No Acute Distress] : in no acute distress [Sclera] : the sclera and conjunctiva were normal [Jugular Venous Distention Increased] : there was no jugular-venous distention [Auscultation Breath Sounds / Voice Sounds] : lungs were clear to auscultation bilaterally [Heart Rate And Rhythm] : heart rate was normal and rhythm regular [Heart Sounds] : normal S1 and S2 [Edema] : there was no peripheral edema [Abdomen Soft] : soft [Abdomen Tenderness] : non-tender [No CVA Tenderness] : no ~M costovertebral angle tenderness [Involuntary Movements] : no involuntary movements were seen [] : no rash [No Focal Deficits] : no focal deficits [Oriented To Time, Place, And Person] : oriented to person, place, and time [Affect] : the affect was normal

## 2024-01-16 NOTE — HISTORY OF PRESENT ILLNESS
[FreeTextEntry1] : f/u CKD no complaints-- says switched to Farxiga from Jardiance due to rash -- hasnt recurred BP has been good at doctor visits-- Not checking BP at home on Ozempic-- but hasnt lost weight yet  sugars better though off tobacco > year! labs done and reviewed - 11/10: cr 1.3, k4.1, hco3 29, ca 9.8, vit D 30,  ph 3.8, uric 7.5, ur prot/cr 171 mg , vit D 39 PCP Dr Oconnor

## 2024-07-16 ENCOUNTER — APPOINTMENT (OUTPATIENT)
Dept: NEPHROLOGY | Facility: CLINIC | Age: 79
End: 2024-07-16
Payer: MEDICARE

## 2024-07-16 VITALS — DIASTOLIC BLOOD PRESSURE: 84 MMHG | WEIGHT: 184 LBS | SYSTOLIC BLOOD PRESSURE: 147 MMHG | BODY MASS INDEX: 29.7 KG/M2

## 2024-07-16 VITALS — DIASTOLIC BLOOD PRESSURE: 85 MMHG | HEART RATE: 69 BPM | SYSTOLIC BLOOD PRESSURE: 149 MMHG

## 2024-07-16 DIAGNOSIS — N18.30 CHRONIC KIDNEY DISEASE, STAGE 3 UNSPECIFIED: ICD-10-CM

## 2024-07-16 DIAGNOSIS — I10 ESSENTIAL (PRIMARY) HYPERTENSION: ICD-10-CM

## 2024-07-16 PROCEDURE — 99214 OFFICE O/P EST MOD 30 MIN: CPT

## 2024-09-05 ENCOUNTER — APPOINTMENT (OUTPATIENT)
Dept: NEPHROLOGY | Facility: CLINIC | Age: 79
End: 2024-09-05

## 2024-09-05 VITALS — DIASTOLIC BLOOD PRESSURE: 71 MMHG | HEART RATE: 78 BPM | SYSTOLIC BLOOD PRESSURE: 148 MMHG

## 2024-09-05 PROCEDURE — 93784 AMBL BP MNTR W/SOFTWARE: CPT

## 2024-09-26 RX ORDER — HYDROCHLOROTHIAZIDE 12.5 MG/1
12.5 TABLET ORAL
Qty: 90 | Refills: 3 | Status: ACTIVE | COMMUNITY
Start: 2024-09-26 | End: 1900-01-01

## 2025-01-27 ENCOUNTER — APPOINTMENT (OUTPATIENT)
Dept: NEPHROLOGY | Facility: CLINIC | Age: 80
End: 2025-01-27
Payer: MEDICARE

## 2025-01-27 ENCOUNTER — NON-APPOINTMENT (OUTPATIENT)
Age: 80
End: 2025-01-27

## 2025-01-27 VITALS
DIASTOLIC BLOOD PRESSURE: 79 MMHG | WEIGHT: 180 LBS | BODY MASS INDEX: 29.05 KG/M2 | SYSTOLIC BLOOD PRESSURE: 126 MMHG | HEART RATE: 85 BPM

## 2025-01-27 DIAGNOSIS — N18.30 CHRONIC KIDNEY DISEASE, STAGE 3 UNSPECIFIED: ICD-10-CM

## 2025-01-27 DIAGNOSIS — I10 ESSENTIAL (PRIMARY) HYPERTENSION: ICD-10-CM

## 2025-01-27 PROCEDURE — 99214 OFFICE O/P EST MOD 30 MIN: CPT

## 2025-03-27 NOTE — HISTORY OF PRESENT ILLNESS
[FreeTextEntry1] : f/u CKD\par reports off atenolol some time ago  and  still on another med for BP -- only 1/2 pill/d - still doesn’t remember name (cardiologist Dr Melanie Roberts ) -- during visit remembers that is spironolactone\par BP has been good - on Fitbit has been excellent!\par feels well-- some intermittent pain rt foot - front of foot - wonders if has gout \par labs done and reviewed\par meds reviewed with pt and list updated\par PCP Dr Oconnor\par  Quality 226: Preventive Care And Screening: Tobacco Use: Screening And Cessation Intervention: Patient screened for tobacco use and is an ex/non-smoker Detail Level: Detailed Quality 431: Preventive Care And Screening: Unhealthy Alcohol Use - Screening: Patient not identified as an unhealthy alcohol user when screened for unhealthy alcohol use using a systematic screening method

## 2025-09-12 ENCOUNTER — APPOINTMENT (OUTPATIENT)
Dept: NEPHROLOGY | Facility: CLINIC | Age: 80
End: 2025-09-12
Payer: MEDICARE

## 2025-09-12 VITALS
SYSTOLIC BLOOD PRESSURE: 144 MMHG | BODY MASS INDEX: 29.05 KG/M2 | DIASTOLIC BLOOD PRESSURE: 80 MMHG | WEIGHT: 180 LBS | HEART RATE: 80 BPM

## 2025-09-12 VITALS — BODY MASS INDEX: 27.92 KG/M2 | SYSTOLIC BLOOD PRESSURE: 126 MMHG | WEIGHT: 173 LBS | DIASTOLIC BLOOD PRESSURE: 86 MMHG

## 2025-09-12 VITALS — DIASTOLIC BLOOD PRESSURE: 78 MMHG | SYSTOLIC BLOOD PRESSURE: 123 MMHG

## 2025-09-12 DIAGNOSIS — I10 ESSENTIAL (PRIMARY) HYPERTENSION: ICD-10-CM

## 2025-09-12 DIAGNOSIS — N18.30 CHRONIC KIDNEY DISEASE, STAGE 3 UNSPECIFIED: ICD-10-CM

## 2025-09-12 PROCEDURE — 99214 OFFICE O/P EST MOD 30 MIN: CPT
